# Patient Record
Sex: FEMALE | Race: WHITE | NOT HISPANIC OR LATINO | Employment: OTHER | URBAN - METROPOLITAN AREA
[De-identification: names, ages, dates, MRNs, and addresses within clinical notes are randomized per-mention and may not be internally consistent; named-entity substitution may affect disease eponyms.]

---

## 2023-06-01 DIAGNOSIS — I10 HYPERTENSION, UNSPECIFIED TYPE: ICD-10-CM

## 2023-06-01 RX ORDER — LISINOPRIL 10 MG/1
1 TABLET ORAL DAILY
COMMUNITY
Start: 2015-01-26 | End: 2023-06-01 | Stop reason: SDUPTHER

## 2023-06-02 RX ORDER — LISINOPRIL 10 MG/1
10 TABLET ORAL DAILY
Qty: 90 TABLET | Refills: 1 | Status: SHIPPED | OUTPATIENT
Start: 2023-06-02 | End: 2023-06-14 | Stop reason: SDUPTHER

## 2023-06-14 DIAGNOSIS — I10 HYPERTENSION, UNSPECIFIED TYPE: ICD-10-CM

## 2023-06-14 RX ORDER — HYDROCHLOROTHIAZIDE 12.5 MG/1
TABLET ORAL
COMMUNITY
End: 2023-06-14 | Stop reason: SDUPTHER

## 2023-06-14 RX ORDER — AMIODARONE HYDROCHLORIDE 200 MG/1
TABLET ORAL
COMMUNITY

## 2023-06-14 RX ORDER — HYDROCHLOROTHIAZIDE 12.5 MG/1
TABLET ORAL
Qty: 90 TABLET | Refills: 3 | Status: SHIPPED | OUTPATIENT
Start: 2023-06-14 | End: 2023-08-04 | Stop reason: SDUPTHER

## 2023-06-14 RX ORDER — LEVOTHYROXINE SODIUM 100 UG/1
100 TABLET ORAL DAILY
COMMUNITY
End: 2023-06-14 | Stop reason: SDUPTHER

## 2023-06-14 RX ORDER — LISINOPRIL 10 MG/1
10 TABLET ORAL DAILY
Qty: 90 TABLET | Refills: 1 | Status: SHIPPED | OUTPATIENT
Start: 2023-06-14 | End: 2024-06-13

## 2023-06-14 RX ORDER — LEVOTHYROXINE SODIUM 100 UG/1
100 TABLET ORAL DAILY
Qty: 90 TABLET | Refills: 3 | Status: SHIPPED | OUTPATIENT
Start: 2023-06-14 | End: 2023-08-04 | Stop reason: SDUPTHER

## 2023-06-29 ENCOUNTER — TELEPHONE (OUTPATIENT)
Dept: PRIMARY CARE | Facility: CLINIC | Age: 88
End: 2023-06-29
Payer: MEDICARE

## 2023-06-29 NOTE — TELEPHONE ENCOUNTER
DAUGHTER RAMILA CALLED AND STATED THAT SHE WAS TOLD HER MOM WOULD NEED AN ULTRASOUND ORDER FOR HER BREAST BECAUSE HER MAMMOGRAM SHOWED THAT THERE WAS A LUMP.  RAMILA STATES THEY HAD NO KNOWLEDGE THAT HER MOM HAD A LUMP IN HER BREAST.  PLEASE ADVISE  Thank you

## 2023-07-20 ENCOUNTER — TELEPHONE (OUTPATIENT)
Dept: PRIMARY CARE | Facility: CLINIC | Age: 88
End: 2023-07-20
Payer: MEDICARE

## 2023-07-20 NOTE — TELEPHONE ENCOUNTER
Talked to son about mammogram. He wants to make sure that mamm is not going to take images of the prosthetic and pt be charged. I told him to discuss that with rad services at the time of imaging-he said he will pass it onto his sister and mom (pt)

## 2023-07-20 NOTE — TELEPHONE ENCOUNTER
I left a general msg for pt to call back r/t her mammogram questions.  Per the provider, pt is to have a general screening only. No indication for other type at this time

## 2023-08-03 ENCOUNTER — TELEPHONE (OUTPATIENT)
Dept: PRIMARY CARE | Facility: CLINIC | Age: 88
End: 2023-08-03
Payer: MEDICARE

## 2023-08-03 NOTE — TELEPHONE ENCOUNTER
Daughter, Bria, in office today. She is requesting a regular MAMM screening verse Diagnostic and ultrasound. Per provider note, ok for regular screening. I gave her a new req and faxed it to Central Scheduling

## 2023-08-04 DIAGNOSIS — I10 HYPERTENSION, UNSPECIFIED TYPE: ICD-10-CM

## 2023-08-04 RX ORDER — METOPROLOL TARTRATE 50 MG/1
TABLET ORAL
COMMUNITY
End: 2023-08-04 | Stop reason: SDUPTHER

## 2023-08-04 RX ORDER — METOPROLOL TARTRATE 50 MG/1
50 TABLET ORAL 2 TIMES DAILY
Qty: 90 TABLET | Refills: 3 | Status: SHIPPED | OUTPATIENT
Start: 2023-08-04

## 2023-08-04 RX ORDER — LEVOTHYROXINE SODIUM 100 UG/1
100 TABLET ORAL DAILY
Qty: 90 TABLET | Refills: 3 | Status: SHIPPED | OUTPATIENT
Start: 2023-08-04 | End: 2023-09-06 | Stop reason: SDUPTHER

## 2023-08-04 RX ORDER — HYDROCHLOROTHIAZIDE 12.5 MG/1
TABLET ORAL
Qty: 90 TABLET | Refills: 3 | Status: SHIPPED | OUTPATIENT
Start: 2023-08-04

## 2023-09-06 ENCOUNTER — TELEPHONE (OUTPATIENT)
Dept: PRIMARY CARE | Facility: CLINIC | Age: 88
End: 2023-09-06

## 2023-09-06 ENCOUNTER — OFFICE VISIT (OUTPATIENT)
Dept: PRIMARY CARE | Facility: CLINIC | Age: 88
End: 2023-09-06
Payer: MEDICARE

## 2023-09-06 VITALS
HEART RATE: 45 BPM | HEIGHT: 60 IN | DIASTOLIC BLOOD PRESSURE: 58 MMHG | TEMPERATURE: 98.4 F | WEIGHT: 173.2 LBS | SYSTOLIC BLOOD PRESSURE: 110 MMHG | BODY MASS INDEX: 34 KG/M2 | OXYGEN SATURATION: 98 %

## 2023-09-06 DIAGNOSIS — I48.91 ATRIAL FIBRILLATION, UNSPECIFIED TYPE (MULTI): Primary | ICD-10-CM

## 2023-09-06 DIAGNOSIS — I10 HYPERTENSION, UNSPECIFIED TYPE: ICD-10-CM

## 2023-09-06 DIAGNOSIS — E03.9 HYPOTHYROIDISM, UNSPECIFIED TYPE: ICD-10-CM

## 2023-09-06 DIAGNOSIS — C50.919 MALIGNANT NEOPLASM OF FEMALE BREAST, UNSPECIFIED ESTROGEN RECEPTOR STATUS, UNSPECIFIED LATERALITY, UNSPECIFIED SITE OF BREAST (MULTI): ICD-10-CM

## 2023-09-06 PROCEDURE — 1159F MED LIST DOCD IN RCRD: CPT | Performed by: INTERNAL MEDICINE

## 2023-09-06 PROCEDURE — 99214 OFFICE O/P EST MOD 30 MIN: CPT | Performed by: INTERNAL MEDICINE

## 2023-09-06 PROCEDURE — 3074F SYST BP LT 130 MM HG: CPT | Performed by: INTERNAL MEDICINE

## 2023-09-06 PROCEDURE — 3078F DIAST BP <80 MM HG: CPT | Performed by: INTERNAL MEDICINE

## 2023-09-06 PROCEDURE — 1036F TOBACCO NON-USER: CPT | Performed by: INTERNAL MEDICINE

## 2023-09-06 RX ORDER — LEVOTHYROXINE SODIUM 100 UG/1
100 TABLET ORAL DAILY
Qty: 90 TABLET | Refills: 3 | Status: SHIPPED | OUTPATIENT
Start: 2023-09-06

## 2023-09-06 NOTE — TELEPHONE ENCOUNTER
CALL FROM SCHEDULING. PT DAUGHTER (NOT SURE OF NAME PER SCHEDULING) STATES IT IS FOR SCREENING NOT DIAGNOSTIC FOR MAMMOGRAM. THERE ARE 3 ORDERS MARKED DIAGNOSTIC. PLEASE CONFIRM

## 2023-09-06 NOTE — PROGRESS NOTES
Subjective   Patient ID: Jessica Thayer is a 89 y.o. female who presents for the following  Chronic disease follow up  MEDICARE WELLNESS Feb 3, 2023    Assessment/Plan   HTN: stable,  Currently on hctz, lisinopril; continue     A-Fib: STABLE Patient follows with Dr. Delcid. patient is taking xarelto 15mg daily. Continue amiodarone. Will see Dr Delcid next week. No bleeding on xarelto reported.      Hypothyroid:  stable, Pt is currently on amiodarone. On Synthroid 112mcg.      Breast cancer (HER2 and Paget, R and L respectfully): Diagnosed in 2009. In remission for 10 years. Patient no longer Dr Mack with [facility]. but needs a diagnostic left breast yearly mammogram September 2023 scheduled       patient and son wants DNR CCA     HPI  Female with A. fib (on Amiodarone and Xeralto), Hypothyroid, Vertigo, HTN, HLD, OA, Left Breast Cancer s/p Left Mastectomy, DDD/DJD Spinal Segments and Hearing Impairment comes for the following:     Jamaal (oldest son) who is with patient today.      No new issues today.       HTN: Patient denies any headaches, blurred vision. Patient denies any stroke like symptoms. she says that her home BP is systolics are 120s      A-Fib: Patient follows with Dr. Delcid. Denies any angina. Denies any sob chest pain or pressure especially with exertion. Patient follows with cardiology on a regular basis and will see Dr eDlcid next week.      Hypothyroid: Patient denies any hypo or hypothyroid symptoms. Patient denies any palpitations, diarrhea or constipation     Breast cancer (HER2 and Paget, R and L respectfully): Diagnosed in 2009. In remission for > 10 years. Patient following with Dr Mack     Visit Vitals  /58 (Patient Position: Standing) Comment: standing   Pulse (!) 45   Temp 36.9 °C (98.4 °F)   Ht 1.524 m (5')   Wt 78.6 kg (173 lb 3.2 oz)   SpO2 98%   BMI 33.83 kg/m²   Smoking Status Never   BSA 1.82 m²     PHYSICAL EXAM   General appearance: Alert and in no acute distress. speech is clear  and coherent  HEENT: Sclera and conjunctiva white, EOMI,     Respiratory : No respiratory distress, normal respiratory rhythm and effort. Clear bilateral breath sounds. No wheezing or rhonchi.   Cardiovascular: heart rate regular, S1, S2. no murmurs. no Lower extremity edema  Skin inspection: Normal skin color and pigmentation, normal skin turgor and no visible rash, induration, or cellulitis  MSK: 5/5 strength upper and lower extremities without gait abnormalities. no loss of muscle mass   Neuro: 2-12 CN grossly intact.  no slurred speech. no lateralizing deficit  Psychiatric Orientation: Oriented to person, place, and time. no depression, homicidal or suicidal thoughts, normal affect       REVIEW OF SYSTEMS   Constitutional: not feeling tired and no fever, chills or sweats. Denies weight loss   no headache  Cardiovascular: no exertional chest pain, no palpitations, no lower extremity edema    Lungs: Denies shortness of breath, exertional dyspnea, wheezing  Gastrointestinal: no change in bowel habits, no diarrhea, no nausea, no vomiting and no abdominal pain.   Musculoskeletal: no myalgias, no muscle weakness and no limb swelling.   Skin: no rashes, no change in skin color and pigmentation and no skin lumps.   Neurological: no headaches, no seizures, no numbness, no lateralizing deficits and no fainting.   Psychiatric: no depression and no anxiety.   Urine: denies polyuria, hematuria, dysuria        No Known Allergies    Current Outpatient Medications   Medication Sig Dispense Refill    amiodarone (Pacerone) 200 mg tablet TAKE 1/2 TABLET BY MOUTH EVERY DAY  5 TIMES A WEEK. SKIP 2 DAYS A WEEK      hydroCHLOROthiazide (HYDRODiuril) 12.5 mg tablet TAKE ONE TABLET BY MOUTH DAILY  not on mondays thursdays  or saturdays. 90 tablet 3    levothyroxine (Synthroid, Levoxyl) 100 mcg tablet Take 1 tablet (100 mcg) by mouth once daily. 90 tablet 3    lisinopril 10 mg tablet Take 1 tablet (10 mg) by mouth once daily. 90 tablet  1    lovastatin (Mevacor) 20 mg tablet TAKE 1 TABLET DAILY AS DIRECTED. 90 tablet 0    metoprolol tartrate (Lopressor) 50 mg tablet Take 1 tablet (50 mg) by mouth 2 times a day. 90 tablet 3     No current facility-administered medications for this visit.       Objective     No visits with results within 4 Month(s) from this visit.   Latest known visit with results is:   Legacy Encounter on 08/14/2019   Component Date Value Ref Range Status    Color, Urine 08/14/2019 YELLOW  STRAW,YELLOW Final    Appearance, Urine 08/14/2019 HAZY  CLEAR Final    Specific Gravity, Urine 08/14/2019 1.008  1.005 - 1.035 Final    pH, Urine 08/14/2019 7.0  5.0 - 8.0 Final    Protein, Urine 08/14/2019 NEGATIVE  NEGATIVE mg/dL Final    Glucose, Urine 08/14/2019 NEGATIVE  NEGATIVE mg/dL Final    Blood, Urine 08/14/2019 NEGATIVE  NEGATIVE Final    Ketones, Urine 08/14/2019 NEGATIVE  NEGATIVE mg/dL Final    Bilirubin, Urine 08/14/2019 NEGATIVE  NEGATIVE Final    Urobilinogen, Urine 08/14/2019 <2.0  0.0 - 1.9 mg/dL Final    Nitrite, Urine 08/14/2019 POSITIVE (A)  NEGATIVE Final    Leukocyte Esterase, Urine 08/14/2019 LARGE (3+) (A)  NEGATIVE Final    WBC, Urine 08/14/2019 >182 (A)  0 - 5 /HPF Final    WBC Clumps, Urine 08/14/2019 MANY  /HPF Final    RBC, Urine 08/14/2019 4  0 - 5 /HPF Final    Squamous Epithelial Cells, Urine 08/14/2019 <1  /HPF Final       Radiology: Reviewed imaging in powerchart.  No results found.    No family history on file.  Social History     Socioeconomic History    Marital status:      Spouse name: None    Number of children: None    Years of education: None    Highest education level: None   Occupational History    None   Tobacco Use    Smoking status: Never    Smokeless tobacco: Never   Substance and Sexual Activity    Alcohol use: Never    Drug use: Never    Sexual activity: None   Other Topics Concern    None   Social History Narrative    None     Social Determinants of Health     Financial Resource Strain:  Not on file   Food Insecurity: Not on file   Transportation Needs: Not on file   Physical Activity: Not on file   Stress: Not on file   Social Connections: Not on file   Intimate Partner Violence: Not on file   Housing Stability: Not on file     Past Medical History:   Diagnosis Date    Arthropathy, unspecified 12/04/2013    Arthropathy    Dermatitis, unspecified 12/04/2013    Dermatitis, eczematoid    Encounter for other preprocedural examination 12/04/2013    Pre-op examination    Encounter for screening for malignant neoplasm of colon 12/04/2013    Encounter for screening for malignant neoplasm of colon    Malignant neoplasm of unspecified site of unspecified female breast (CMS/[facility]) 12/03/2013    Adenocarcinoma of breast    Obesity, unspecified 12/04/2013    Obesity    Osteitis deformans of unspecified bone 12/04/2013    Paget's disease of bone    Other abnormal and inconclusive findings on diagnostic imaging of breast 12/04/2013    Mammogram abnormal    Other conditions influencing health status 12/04/2013    Diverticulitis Of The Bladder    Other conditions influencing health status 12/04/2013    Neoplasm Of Unspecified Behavior    Other conditions influencing health status 12/04/2013    Large Intestine Neoplasm    Other specified diseases of anus and rectum 12/04/2013    Rectal pain    Personal history of malignant melanoma of skin 01/08/2021    History of malignant melanoma    Personal history of other diseases of the circulatory system 04/16/2019    History of chronic atrial fibrillation    Personal history of other diseases of the musculoskeletal system and connective tissue     History of degenerative disc disease    Personal history of other diseases of the musculoskeletal system and connective tissue     History of osteoarthritis    Personal history of other endocrine, nutritional and metabolic disease     History of hypokalemia    Sciatica, unspecified side 12/04/2013    Sciatica    Spondylosis without  myelopathy or radiculopathy, cervical region     DJD (degenerative joint disease) of cervical spine    Unspecified lump in unspecified breast 12/04/2013    Lump or mass in breast     Past Surgical History:   Procedure Laterality Date    CHOLECYSTECTOMY  05/30/2014    Cholecystectomy    MASTECTOMY  05/30/2014    Breast Surgery Mastectomy    OTHER SURGICAL HISTORY  02/26/2014    Aftercare Following Surgery    TOTAL KNEE ARTHROPLASTY  05/30/2014    Knee Replacement       Charting was completed using voice recognition technology and may include unintended errors.

## 2023-09-11 ENCOUNTER — TELEPHONE (OUTPATIENT)
Dept: PRIMARY CARE | Facility: CLINIC | Age: 88
End: 2023-09-11
Payer: MEDICARE

## 2023-09-11 NOTE — TELEPHONE ENCOUNTER
I DID CALL SCHEDULING AND SPOKE WITH [place]. SHE STATES PT HAS HAD SCREENING PAST 5 YRS UNILATERAL

## 2023-09-13 NOTE — TELEPHONE ENCOUNTER
I talked to Daughter and told her the below from Dr Pop    Breast cancer (HER2 and Paget, R and L respectfully): Diagnosed in 2009. In remission for 10 years. Patient no longer Dr Mack with [facility]. but needs a diagnostic left breast yearly mammogram September 2023 scheduled      Dr Mack will send over   It is supposed to be a regular screening  Per daughter-I educated that b/c of past Breast Ca that Dr Pop ordered the diagnostic- she verbalized understanding.    Daughter states she will take pt next week to Mamm for regular  screening only and if  thinks she needs something more after that then she will talk to him personally about it.

## 2023-10-11 ENCOUNTER — TELEPHONE (OUTPATIENT)
Dept: PRIMARY CARE | Facility: CLINIC | Age: 88
End: 2023-10-11
Payer: MEDICARE

## 2023-10-11 ENCOUNTER — TELEPHONE (OUTPATIENT)
Dept: PRIMARY CARE | Facility: CLINIC | Age: 88
End: 2023-10-11

## 2023-10-11 NOTE — TELEPHONE ENCOUNTER
I called  the visiting nurse for the b/p on this pt-it was WNL yesterday  Daughter called stating it was low  Her mouth is dry and her skin tents on her dorsal hand and she is dizzy intermittently   I told her to hold b/p meds and go to the ER for fluids  Her daughter Meghna states she does not want to because it takes a lot to get her there. She took her b/p two more times and it was also low-I told her to make her decision but my clinical decision stands. She verbalized uderstanding

## 2023-10-11 NOTE — TELEPHONE ENCOUNTER
PT was discharged from hospital on 10/8/23.  PT's daughter is concerned that PT's BP is to low and she is wondering if she should hold meds back?    BP 75/51    Nurse was in yesterday and stated BP was low.       Meghna Rodarte CB is:  [phone]

## 2023-10-11 NOTE — TELEPHONE ENCOUNTER
Visiting nurse started seeing this pt per Nurse HOOKS for Nursing and PT care at home    Her number is [phone] in case we need to contact her int he future

## 2024-02-02 ENCOUNTER — TELEPHONE (OUTPATIENT)
Dept: PRIMARY CARE | Facility: CLINIC | Age: 89
End: 2024-02-02

## 2024-02-02 DIAGNOSIS — E78.5 HYPERLIPIDEMIA, UNSPECIFIED HYPERLIPIDEMIA TYPE: Primary | ICD-10-CM

## 2024-02-02 RX ORDER — ATORVASTATIN CALCIUM 20 MG/1
20 TABLET, FILM COATED ORAL DAILY
Qty: 90 TABLET | Refills: 3 | Status: SHIPPED | OUTPATIENT
Start: 2024-02-02 | End: 2025-02-01

## 2024-02-02 NOTE — TELEPHONE ENCOUNTER
Pt was wondering if you could change lovastatin to atorvastatin     [facility] pharm [phone]

## 2024-03-06 ENCOUNTER — OFFICE VISIT (OUTPATIENT)
Dept: PRIMARY CARE | Facility: CLINIC | Age: 89
End: 2024-03-06
Payer: MEDICARE

## 2024-03-06 ENCOUNTER — LAB (OUTPATIENT)
Dept: LAB | Facility: LAB | Age: 89
End: 2024-03-06
Payer: MEDICARE

## 2024-03-06 VITALS
HEIGHT: 65 IN | BODY MASS INDEX: 27.32 KG/M2 | HEART RATE: 71 BPM | WEIGHT: 164 LBS | OXYGEN SATURATION: 97 % | DIASTOLIC BLOOD PRESSURE: 64 MMHG | SYSTOLIC BLOOD PRESSURE: 120 MMHG

## 2024-03-06 DIAGNOSIS — C50.919 MALIGNANT NEOPLASM OF FEMALE BREAST, UNSPECIFIED ESTROGEN RECEPTOR STATUS, UNSPECIFIED LATERALITY, UNSPECIFIED SITE OF BREAST (MULTI): ICD-10-CM

## 2024-03-06 DIAGNOSIS — E03.9 HYPOTHYROIDISM, UNSPECIFIED TYPE: ICD-10-CM

## 2024-03-06 DIAGNOSIS — I10 HYPERTENSION, UNSPECIFIED TYPE: ICD-10-CM

## 2024-03-06 DIAGNOSIS — I48.91 ATRIAL FIBRILLATION, UNSPECIFIED TYPE (MULTI): ICD-10-CM

## 2024-03-06 DIAGNOSIS — Z00.00 WELLNESS EXAMINATION: Primary | ICD-10-CM

## 2024-03-06 LAB
ALBUMIN SERPL BCP-MCNC: 4.1 G/DL (ref 3.4–5)
ALP SERPL-CCNC: 91 U/L (ref 33–136)
ALT SERPL W P-5'-P-CCNC: 11 U/L (ref 7–45)
ANION GAP SERPL CALC-SCNC: 11 MMOL/L (ref 10–20)
AST SERPL W P-5'-P-CCNC: 16 U/L (ref 9–39)
BILIRUB SERPL-MCNC: 0.7 MG/DL (ref 0–1.2)
BUN SERPL-MCNC: 16 MG/DL (ref 6–23)
CALCIUM SERPL-MCNC: 9.2 MG/DL (ref 8.6–10.6)
CHLORIDE SERPL-SCNC: 99 MMOL/L (ref 98–107)
CO2 SERPL-SCNC: 27 MMOL/L (ref 21–32)
CREAT SERPL-MCNC: 0.75 MG/DL (ref 0.5–1.05)
EGFRCR SERPLBLD CKD-EPI 2021: 76 ML/MIN/1.73M*2
ERYTHROCYTE [DISTWIDTH] IN BLOOD BY AUTOMATED COUNT: 13.9 % (ref 11.5–14.5)
GLUCOSE SERPL-MCNC: 90 MG/DL (ref 74–99)
HCT VFR BLD AUTO: 46.5 % (ref 36–46)
HGB BLD-MCNC: 15.3 G/DL (ref 12–16)
MCH RBC QN AUTO: 31.4 PG (ref 26–34)
MCHC RBC AUTO-ENTMCNC: 32.9 G/DL (ref 32–36)
MCV RBC AUTO: 95 FL (ref 80–100)
NRBC BLD-RTO: 0 /100 WBCS (ref 0–0)
PLATELET # BLD AUTO: 259 X10*3/UL (ref 150–450)
POTASSIUM SERPL-SCNC: 4.2 MMOL/L (ref 3.5–5.3)
PROT SERPL-MCNC: 6.4 G/DL (ref 6.4–8.2)
RBC # BLD AUTO: 4.88 X10*6/UL (ref 4–5.2)
SODIUM SERPL-SCNC: 133 MMOL/L (ref 136–145)
TSH SERPL-ACNC: 0.9 MIU/L (ref 0.44–3.98)
WBC # BLD AUTO: 6.4 X10*3/UL (ref 4.4–11.3)

## 2024-03-06 PROCEDURE — 1170F FXNL STATUS ASSESSED: CPT | Performed by: INTERNAL MEDICINE

## 2024-03-06 PROCEDURE — 84443 ASSAY THYROID STIM HORMONE: CPT

## 2024-03-06 PROCEDURE — 36415 COLL VENOUS BLD VENIPUNCTURE: CPT

## 2024-03-06 PROCEDURE — 3078F DIAST BP <80 MM HG: CPT | Performed by: INTERNAL MEDICINE

## 2024-03-06 PROCEDURE — G0439 PPPS, SUBSEQ VISIT: HCPCS | Performed by: INTERNAL MEDICINE

## 2024-03-06 PROCEDURE — 85027 COMPLETE CBC AUTOMATED: CPT

## 2024-03-06 PROCEDURE — 3074F SYST BP LT 130 MM HG: CPT | Performed by: INTERNAL MEDICINE

## 2024-03-06 PROCEDURE — 80053 COMPREHEN METABOLIC PANEL: CPT

## 2024-03-06 PROCEDURE — 99497 ADVNCD CARE PLAN 30 MIN: CPT | Performed by: INTERNAL MEDICINE

## 2024-03-06 PROCEDURE — 1159F MED LIST DOCD IN RCRD: CPT | Performed by: INTERNAL MEDICINE

## 2024-03-06 PROCEDURE — 99214 OFFICE O/P EST MOD 30 MIN: CPT | Performed by: INTERNAL MEDICINE

## 2024-03-06 PROCEDURE — 1160F RVW MEDS BY RX/DR IN RCRD: CPT | Performed by: INTERNAL MEDICINE

## 2024-03-06 PROCEDURE — 1036F TOBACCO NON-USER: CPT | Performed by: INTERNAL MEDICINE

## 2024-03-06 ASSESSMENT — PATIENT HEALTH QUESTIONNAIRE - PHQ9
1. LITTLE INTEREST OR PLEASURE IN DOING THINGS: NOT AT ALL
SUM OF ALL RESPONSES TO PHQ9 QUESTIONS 1 AND 2: 0
2. FEELING DOWN, DEPRESSED OR HOPELESS: NOT AT ALL

## 2024-03-06 ASSESSMENT — ACTIVITIES OF DAILY LIVING (ADL)
DRESSING: INDEPENDENT
DOING_HOUSEWORK: NEEDS ASSISTANCE
GROCERY_SHOPPING: TOTAL CARE
TAKING_MEDICATION: TOTAL CARE
BATHING: INDEPENDENT
MANAGING_FINANCES: TOTAL CARE

## 2024-03-06 NOTE — PROGRESS NOTES
Subjective   Patient ID: Jessica Thayer is a 89 y.o. female who presents for the following  MEDICARE WELLNESS 3/6/2024 AND CHRONIC FOLLOW UP     Assessment/Plan   S/p fall mechanical fall: home health physical therapy    HTN: stable,     hydrochlorothiazide taking 12.5mg MWF,   Lisinopril 10mg daily   Metoprolol 50mg bid      A-Fib: STABLE Patient follows with Dr. Delcid. patient is taking xarelto 15mg daily. Continue amiodarone. Will see Dr Delcid next week. No bleeding on xarelto reported.      Hypothyroid:  stable, Pt is currently on amiodarone. On Synthroid 112mcg.      Breast cancer (HER2 and Paget, R and L respectfully): Diagnosed in 2009. In remission for 10 years. Patient no longer Dr Frederick with [facility]. but needs a diagnostic left breast  Patient no longer wants yearly mammograms       patient and son wants DNR CCA   Lots of help total care at home through daughter. 76  Advanced care planning was discussed for 20 mins  DIscussed in details the options of advanced directives with patient in a voluntary nature. We discussed the differences between full code, comfort care arrest and comfort care. Patient was educated in detail regarding end-of-life care including options for hospice and palliative care. I provided details to patient regarding the importance of creating both a Living Will and Power of  documents and encouraged patient to complete these documents.      HPI  Female with A. fib (on Amiodarone and Xeralto), Hypothyroid, Vertigo, HTN, HLD, OA, Left Breast Cancer s/p Left Mastectomy, DDD/DJD Spinal Segments and Hearing Impairment comes for the following:     Jamaal (oldest son) who is with patient today.      No new issues today.       HTN: Patient denies any headaches, blurred vision. Patient denies any stroke like symptoms. she says that her home BP is systolics are 120s      A-Fib: Patient follows with Dr. Delcid. Denies any angina. Denies any sob chest pain or pressure especially with exertion.  "Patient follows with cardiology on a regular basis and will see Dr Delcid next week.      Hypothyroid: Patient denies any hypo or hypothyroid symptoms. Patient denies any palpitations, diarrhea or constipation     Breast cancer (HER2 and Paget, R and L respectfully): Diagnosed in 2009. In remission for > 10 years. Patient following with Dr Mack     Visit Vitals  /64 (BP Location: Right arm, Patient Position: Sitting, BP Cuff Size: Adult)   Pulse 71   Ht 1.651 m (5' 5\")   Wt 74.4 kg (164 lb)   SpO2 97%   BMI 27.29 kg/m²   Smoking Status Never   BSA 1.85 m²     PHYSICAL EXAM   General appearance: Alert and in no acute distress. speech is clear and coherent  HEENT: Sclera and conjunctiva white, EOMI,     Respiratory : No respiratory distress, normal respiratory rhythm and effort. Clear bilateral breath sounds. No wheezing or rhonchi.   Cardiovascular: heart rate regular, S1, S2. no murmurs. no Lower extremity edema  Skin inspection: Normal skin color and pigmentation, normal skin turgor and no visible rash, induration, or cellulitis  MSK: 5/5 strength upper and lower extremities without gait abnormalities. no loss of muscle mass   Neuro: 2-12 CN grossly intact.  no slurred speech. no lateralizing deficit  Psychiatric Orientation: Oriented to person, place, and time. no depression, homicidal or suicidal thoughts, normal affect       REVIEW OF SYSTEMS   Constitutional: not feeling tired and no fever, chills or sweats. Denies weight loss   no headache  Cardiovascular: no exertional chest pain, no palpitations, no lower extremity edema    Lungs: Denies shortness of breath, exertional dyspnea, wheezing  Gastrointestinal: no change in bowel habits, no diarrhea, no nausea, no vomiting and no abdominal pain.   Musculoskeletal: no myalgias, no muscle weakness and no limb swelling.   Skin: no rashes, no change in skin color and pigmentation and no skin lumps.   Neurological: no headaches, no seizures, no numbness, no " lateralizing deficits and no fainting.   Psychiatric: no depression and no anxiety.   Urine: denies polyuria, hematuria, dysuria        No Known Allergies    Current Outpatient Medications   Medication Sig Dispense Refill    amiodarone (Pacerone) 200 mg tablet TAKE 1/2 TABLET BY MOUTH EVERY DAY  5 TIMES A WEEK. SKIP 2 DAYS A WEEK      atorvastatin (Lipitor) 20 mg tablet Take 1 tablet (20 mg) by mouth once daily. 90 tablet 3    hydroCHLOROthiazide (HYDRODiuril) 12.5 mg tablet TAKE ONE TABLET BY MOUTH DAILY  not on mondays thursdays  or saturdays. (Patient taking differently: Take 1 tablet (12.5 mg) by mouth 3 times a week. TAKE ONE TABLET BY MOUTH DAILY  on Mondays, Thursdays and Saturdays) 90 tablet 3    levothyroxine (Synthroid, Levoxyl) 100 mcg tablet Take 1 tablet (100 mcg) by mouth once daily. 90 tablet 3    lisinopril 10 mg tablet Take 1 tablet (10 mg) by mouth once daily. 90 tablet 1    metoprolol tartrate (Lopressor) 50 mg tablet Take 1 tablet (50 mg) by mouth 2 times a day. 90 tablet 3    rivaroxaban (Xarelto) 15 mg tablet Take 1 tablet (15 mg) by mouth once daily in the evening. Take with meals.       No current facility-administered medications for this visit.       Objective     No visits with results within 4 Month(s) from this visit.   Latest known visit with results is:   Legacy Encounter on 08/14/2019   Component Date Value Ref Range Status    Color, Urine 08/14/2019 YELLOW  STRAW,YELLOW Final    Appearance, Urine 08/14/2019 HAZY  CLEAR Final    Specific Gravity, Urine 08/14/2019 1.008  1.005 - 1.035 Final    pH, Urine 08/14/2019 7.0  5.0 - 8.0 Final    Protein, Urine 08/14/2019 NEGATIVE  NEGATIVE mg/dL Final    Glucose, Urine 08/14/2019 NEGATIVE  NEGATIVE mg/dL Final    Blood, Urine 08/14/2019 NEGATIVE  NEGATIVE Final    Ketones, Urine 08/14/2019 NEGATIVE  NEGATIVE mg/dL Final    Bilirubin, Urine 08/14/2019 NEGATIVE  NEGATIVE Final    Urobilinogen, Urine 08/14/2019 <2.0  0.0 - 1.9 mg/dL Final     Nitrite, Urine 08/14/2019 POSITIVE (A)  NEGATIVE Final    Leukocyte Esterase, Urine 08/14/2019 LARGE (3+) (A)  NEGATIVE Final    WBC, Urine 08/14/2019 >182 (A)  0 - 5 /HPF Final    WBC Clumps, Urine 08/14/2019 MANY  /HPF Final    RBC, Urine 08/14/2019 4  0 - 5 /HPF Final    Squamous Epithelial Cells, Urine 08/14/2019 <1  /HPF Final       Radiology: Reviewed imaging in powerchart.  No results found.    No family history on file.  Social History     Socioeconomic History    Marital status:      Spouse name: None    Number of children: None    Years of education: None    Highest education level: None   Occupational History    None   Tobacco Use    Smoking status: Never    Smokeless tobacco: Never   Substance and Sexual Activity    Alcohol use: Never    Drug use: Never    Sexual activity: None   Other Topics Concern    None   Social History Narrative    None     Social Determinants of Health     Financial Resource Strain: Not on file   Food Insecurity: Not on file   Transportation Needs: Not on file   Physical Activity: Not on file   Stress: Not on file   Social Connections: Not on file   Intimate Partner Violence: Not on file   Housing Stability: Not on file     Past Medical History:   Diagnosis Date    Arthropathy, unspecified 12/04/2013    Arthropathy    Dermatitis, unspecified 12/04/2013    Dermatitis, eczematoid    Encounter for other preprocedural examination 12/04/2013    Pre-op examination    Encounter for screening for malignant neoplasm of colon 12/04/2013    Encounter for screening for malignant neoplasm of colon    Malignant neoplasm of unspecified site of unspecified female breast (CMS/[facility]) 12/03/2013    Adenocarcinoma of breast    Obesity, unspecified 12/04/2013    Obesity    Osteitis deformans of unspecified bone 12/04/2013    Paget's disease of bone    Other abnormal and inconclusive findings on diagnostic imaging of breast 12/04/2013    Mammogram abnormal    Other conditions influencing health  status 12/04/2013    Diverticulitis Of The Bladder    Other conditions influencing health status 12/04/2013    Neoplasm Of Unspecified Behavior    Other conditions influencing health status 12/04/2013    Large Intestine Neoplasm    Other specified diseases of anus and rectum 12/04/2013    Rectal pain    Personal history of malignant melanoma of skin 01/08/2021    History of malignant melanoma    Personal history of other diseases of the circulatory system 04/16/2019    History of chronic atrial fibrillation    Personal history of other diseases of the musculoskeletal system and connective tissue     History of degenerative disc disease    Personal history of other diseases of the musculoskeletal system and connective tissue     History of osteoarthritis    Personal history of other endocrine, nutritional and metabolic disease     History of hypokalemia    Sciatica, unspecified side 12/04/2013    Sciatica    Spondylosis without myelopathy or radiculopathy, cervical region     DJD (degenerative joint disease) of cervical spine    Unspecified lump in unspecified breast 12/04/2013    Lump or mass in breast     Past Surgical History:   Procedure Laterality Date    CHOLECYSTECTOMY  05/30/2014    Cholecystectomy    MASTECTOMY  05/30/2014    Breast Surgery Mastectomy    OTHER SURGICAL HISTORY  02/26/2014    Aftercare Following Surgery    TOTAL KNEE ARTHROPLASTY  05/30/2014    Knee Replacement       Charting was completed using voice recognition technology and may include unintended errors.

## 2024-06-10 ENCOUNTER — TELEPHONE (OUTPATIENT)
Dept: PRIMARY CARE | Facility: CLINIC | Age: 89
End: 2024-06-10
Payer: MEDICARE

## 2024-06-10 NOTE — TELEPHONE ENCOUNTER
[facility] was seen at [facility], provider called stated he is concerned. She has really bad impacted ear wax and debrox is not softening it up. Provider stated it is very deep and wondering if Dr. Pop can refer patient to ENT specialist for removal. Please call patient son sam back, if Dr. Pop is able to talk with the son he would like to discuss patients care.

## 2024-06-11 ENCOUNTER — OFFICE VISIT (OUTPATIENT)
Dept: PRIMARY CARE | Facility: CLINIC | Age: 89
End: 2024-06-11
Payer: MEDICARE

## 2024-06-11 VITALS
BODY MASS INDEX: 27.82 KG/M2 | WEIGHT: 167 LBS | OXYGEN SATURATION: 96 % | DIASTOLIC BLOOD PRESSURE: 83 MMHG | HEIGHT: 65 IN | HEART RATE: 77 BPM | SYSTOLIC BLOOD PRESSURE: 144 MMHG

## 2024-06-11 DIAGNOSIS — H61.21 IMPACTED CERUMEN OF RIGHT EAR: Primary | ICD-10-CM

## 2024-06-11 PROCEDURE — 69209 REMOVE IMPACTED EAR WAX UNI: CPT | Performed by: INTERNAL MEDICINE

## 2024-06-11 PROCEDURE — 1159F MED LIST DOCD IN RCRD: CPT | Performed by: INTERNAL MEDICINE

## 2024-06-11 PROCEDURE — 99212 OFFICE O/P EST SF 10 MIN: CPT | Performed by: INTERNAL MEDICINE

## 2024-06-11 PROCEDURE — 3077F SYST BP >= 140 MM HG: CPT | Performed by: INTERNAL MEDICINE

## 2024-06-11 PROCEDURE — 3079F DIAST BP 80-89 MM HG: CPT | Performed by: INTERNAL MEDICINE

## 2024-06-11 NOTE — PROGRESS NOTES
Subjective   Patient ID: Jessica Thayer is a 89 y.o. female who presents for the following  MEDICARE WELLNESS 3/6/2024 AND CHRONIC FOLLOW UP     Assessment/Plan   Right ceruman impaction s/p successful irrigation    Other medical issues, see below  S/p fall mechanical fall: home health physical therapy    HTN: stable,     hydrochlorothiazide taking 12.5mg MWF,   Lisinopril 10mg daily   Metoprolol 50mg bid      A-Fib: STABLE Patient follows with Dr. Delcid. patient is taking xarelto 15mg daily. Continue amiodarone. Will see Dr Delcid next week. No bleeding on xarelto reported.      Hypothyroid:  stable, Pt is currently on amiodarone. On Synthroid 112mcg.      Breast cancer (HER2 and Paget, R and L respectfully): Diagnosed in 2009. In remission for 10 years. Patient no longer Dr Frederick with [facility]. but needs a diagnostic left breast  Patient no longer wants yearly mammograms       patient and son wants DNR CCA   Lots of help total care at home through daughter. 76  Advanced care planning was discussed for 20 mins  DIscussed in details the options of advanced directives with patient in a voluntary nature. We discussed the differences between full code, comfort care arrest and comfort care. Patient was educated in detail regarding end-of-life care including options for hospice and palliative care. I provided details to patient regarding the importance of creating both a Living Will and Power of  documents and encouraged patient to complete these documents.      HPI  Female with A. fib (on Amiodarone and Xeralto), Hypothyroid, Vertigo, HTN, HLD, OA, Left Breast Cancer s/p Left Mastectomy, DDD/DJD Spinal Segments and Hearing Impairment comes for the following:     Jamaal (oldest son) who is with patient today.      Right cerumen impaction. Sent from another office as she was felt she needed referral to ENT because of cerumen impaction    Other medical issues, see below     HTN: Patient denies any headaches, blurred  "vision. Patient denies any stroke like symptoms. she says that her home BP is systolics are 120s      A-Fib: Patient follows with Dr. Delcid. Denies any angina. Denies any sob chest pain or pressure especially with exertion. Patient follows with cardiology on a regular basis and will see Dr Delcid next week.      Hypothyroid: Patient denies any hypo or hypothyroid symptoms. Patient denies any palpitations, diarrhea or constipation     Breast cancer (HER2 and Paget, R and L respectfully): Diagnosed in 2009. In remission for > 10 years. Patient following with Dr Mack     Visit Vitals  /83 (BP Location: Right arm, Patient Position: Sitting, BP Cuff Size: Adult)   Pulse 77   Ht 1.651 m (5' 5\")   Wt 75.8 kg (167 lb)   SpO2 96%   BMI 27.79 kg/m²   Smoking Status Never   BSA 1.86 m²     PHYSICAL EXAM   General appearance: Alert and in no acute distress. speech is clear and coherent  HEENT: Sclera and conjunctiva white, EOMI,     Respiratory : No respiratory distress, normal respiratory rhythm and effort. Clear bilateral breath sounds. No wheezing or rhonchi.   Cardiovascular: heart rate regular, S1, S2. no murmurs. no Lower extremity edema   MSK: 5/5 strength upper and lower extremities without gait abnormalities. no loss of muscle mass   Neuro: 2-12 CN grossly intact.  no slurred speech. no lateralizing deficit         REVIEW OF SYSTEMS   Constitutional: not feeling tired and no fever, chills or sweats. no headache  Cardiovascular: no exertional chest pain, no palpitations, no lower extremity edema    Lungs: Denies shortness of breath, exertional dyspnea, wheezing  Gastrointestinal: no change in bowel habits, no diarrhea, no nausea, no vomiting    Neurological: no headaches, no seizures, no numbness, no lateralizing deficits and no fainting.   Psychiatric: no depression and no anxiety.   Urine: denies polyuria, hematuria, dysuria        No Known Allergies    Current Outpatient Medications   Medication Sig Dispense " Refill    amiodarone (Pacerone) 200 mg tablet TAKE 1/2 TABLET BY MOUTH EVERY DAY  5 TIMES A WEEK. SKIP 2 DAYS A WEEK      atorvastatin (Lipitor) 20 mg tablet Take 1 tablet (20 mg) by mouth once daily. 90 tablet 3    levothyroxine (Synthroid, Levoxyl) 100 mcg tablet Take 1 tablet (100 mcg) by mouth once daily. 90 tablet 3    lisinopril 10 mg tablet Take 1 tablet (10 mg) by mouth once daily. 90 tablet 1    metoprolol tartrate (Lopressor) 50 mg tablet Take 1 tablet (50 mg) by mouth 2 times a day. 90 tablet 3    rivaroxaban (Xarelto) 15 mg tablet Take 1 tablet (15 mg) by mouth once daily in the evening. Take with meals.      hydroCHLOROthiazide (HYDRODiuril) 12.5 mg tablet TAKE ONE TABLET BY MOUTH DAILY  not on mondays thursdays  or saturdays. (Patient taking differently: Take 1 tablet (12.5 mg) by mouth 3 times a week. TAKE ONE TABLET BY MOUTH DAILY  on Mondays, Thursdays and Saturdays) 90 tablet 3     No current facility-administered medications for this visit.       Objective     Lab on 03/06/2024   Component Date Value Ref Range Status    WBC 03/06/2024 6.4  4.4 - 11.3 x10*3/uL Final    nRBC 03/06/2024 0.0  0.0 - 0.0 /100 WBCs Final    RBC 03/06/2024 4.88  4.00 - 5.20 x10*6/uL Final    Hemoglobin 03/06/2024 15.3  12.0 - 16.0 g/dL Final    Hematocrit 03/06/2024 46.5 (H)  36.0 - 46.0 % Final    MCV 03/06/2024 95  80 - 100 fL Final    MCH 03/06/2024 31.4  26.0 - 34.0 pg Final    MCHC 03/06/2024 32.9  32.0 - 36.0 g/dL Final    RDW 03/06/2024 13.9  11.5 - 14.5 % Final    Platelets 03/06/2024 259  150 - 450 x10*3/uL Final    Glucose 03/06/2024 90  74 - 99 mg/dL Final    Sodium 03/06/2024 133 (L)  136 - 145 mmol/L Final    Potassium 03/06/2024 4.2  3.5 - 5.3 mmol/L Final    Chloride 03/06/2024 99  98 - 107 mmol/L Final    Bicarbonate 03/06/2024 27  21 - 32 mmol/L Final    Anion Gap 03/06/2024 11  10 - 20 mmol/L Final    Urea Nitrogen 03/06/2024 16  6 - 23 mg/dL Final    Creatinine 03/06/2024 0.75  0.50 - 1.05 mg/dL Final     eGFR 03/06/2024 76  >60 mL/min/1.73m*2 Final    Calcium 03/06/2024 9.2  8.6 - 10.6 mg/dL Final    Albumin 03/06/2024 4.1  3.4 - 5.0 g/dL Final    Alkaline Phosphatase 03/06/2024 91  33 - 136 U/L Final    Total Protein 03/06/2024 6.4  6.4 - 8.2 g/dL Final    AST 03/06/2024 16  9 - 39 U/L Final    Bilirubin, Total 03/06/2024 0.7  0.0 - 1.2 mg/dL Final    ALT 03/06/2024 11  7 - 45 U/L Final    Thyroid Stimulating Hormone 03/06/2024 0.90  0.44 - 3.98 mIU/L Final       Radiology: Reviewed imaging in powerchart.  No results found.    No family history on file.  Social History     Socioeconomic History    Marital status:      Spouse name: None    Number of children: None    Years of education: None    Highest education level: None   Occupational History    None   Tobacco Use    Smoking status: Never    Smokeless tobacco: Never   Substance and Sexual Activity    Alcohol use: Never    Drug use: Never    Sexual activity: None   Other Topics Concern    None   Social History Narrative    None     Social Determinants of Health     Financial Resource Strain: Not on file   Food Insecurity: Not on file   Transportation Needs: Not on file   Physical Activity: Not on file   Stress: Not on file   Social Connections: Not on file   Intimate Partner Violence: Not on file   Housing Stability: Not on file     Past Medical History:   Diagnosis Date    Arthropathy, unspecified 12/04/2013    Arthropathy    Dermatitis, unspecified 12/04/2013    Dermatitis, eczematoid    Encounter for other preprocedural examination 12/04/2013    Pre-op examination    Encounter for screening for malignant neoplasm of colon 12/04/2013    Encounter for screening for malignant neoplasm of colon    Malignant neoplasm of unspecified site of unspecified female breast (Multi) 12/03/2013    Adenocarcinoma of breast    Obesity, unspecified 12/04/2013    Obesity    Osteitis deformans of unspecified bone 12/04/2013    Paget's disease of bone    Other abnormal  and inconclusive findings on diagnostic imaging of breast 12/04/2013    Mammogram abnormal    Other conditions influencing health status 12/04/2013    Diverticulitis Of The Bladder    Other conditions influencing health status 12/04/2013    Neoplasm Of Unspecified Behavior    Other conditions influencing health status 12/04/2013    Large Intestine Neoplasm    Other specified diseases of anus and rectum 12/04/2013    Rectal pain    Personal history of malignant melanoma of skin 01/08/2021    History of malignant melanoma    Personal history of other diseases of the circulatory system 04/16/2019    History of chronic atrial fibrillation    Personal history of other diseases of the musculoskeletal system and connective tissue     History of degenerative disc disease    Personal history of other diseases of the musculoskeletal system and connective tissue     History of osteoarthritis    Personal history of other endocrine, nutritional and metabolic disease     History of hypokalemia    Sciatica, unspecified side 12/04/2013    Sciatica    Spondylosis without myelopathy or radiculopathy, cervical region     DJD (degenerative joint disease) of cervical spine    Unspecified lump in unspecified breast 12/04/2013    Lump or mass in breast     Past Surgical History:   Procedure Laterality Date    CHOLECYSTECTOMY  05/30/2014    Cholecystectomy    MASTECTOMY  05/30/2014    Breast Surgery Mastectomy    OTHER SURGICAL HISTORY  02/26/2014    Aftercare Following Surgery    TOTAL KNEE ARTHROPLASTY  05/30/2014    Knee Replacement       Charting was completed using voice recognition technology and may include unintended errors.

## 2024-06-12 PROBLEM — H61.21 IMPACTED CERUMEN OF RIGHT EAR: Status: ACTIVE | Noted: 2024-06-12

## 2024-07-11 ENCOUNTER — TELEPHONE (OUTPATIENT)
Dept: PRIMARY CARE | Facility: CLINIC | Age: 89
End: 2024-07-11
Payer: MEDICARE

## 2024-07-11 NOTE — TELEPHONE ENCOUNTER
PT fell and broke her hip this morning.  PT is being sent to  for surgery from [place].      PT's daughter is requesting either Dr Brennan or Dr Chauhan to do the surgery.

## 2024-07-26 ENCOUNTER — NURSING HOME VISIT (OUTPATIENT)
Dept: POST ACUTE CARE | Facility: EXTERNAL LOCATION | Age: 89
End: 2024-07-26
Payer: MEDICARE

## 2024-07-26 DIAGNOSIS — I48.91 ATRIAL FIBRILLATION, UNSPECIFIED TYPE (MULTI): ICD-10-CM

## 2024-07-26 DIAGNOSIS — I10 HYPERTENSION, UNSPECIFIED TYPE: ICD-10-CM

## 2024-07-26 DIAGNOSIS — Z47.89 ORTHOPEDIC AFTERCARE: Primary | ICD-10-CM

## 2024-07-26 DIAGNOSIS — E78.5 HYPERLIPIDEMIA, UNSPECIFIED HYPERLIPIDEMIA TYPE: ICD-10-CM

## 2024-07-26 DIAGNOSIS — E03.9 HYPOTHYROIDISM, UNSPECIFIED TYPE: ICD-10-CM

## 2024-07-26 PROCEDURE — 99305 1ST NF CARE MODERATE MDM 35: CPT | Performed by: STUDENT IN AN ORGANIZED HEALTH CARE EDUCATION/TRAINING PROGRAM

## 2024-07-26 NOTE — LETTER
Patient: Jessica Thyaer  : 1934    Encounter Date: 2024    Date of Service: 2024      HPI/Subjective  Jessica Thayer is a 89 y.o. female  With past medical history of A-fib, hypertension, hyperlipidemia, hypothyroidism.  She was admitted to [facility] from 2024 to 2024 for treatment of right femoral intertrochanteric fracture with right hip ORIF.  Hospitalization was complicated by acute blood loss anemia requiring 1 unit packed RBC transfusion.  Patient to have staples removed today.  Tolerating PTOT appropriately.  No fevers, chills, chest pain, shortness of breath reported.  Plan of care discussed, concerns addressed.    Denies fevers, chills, weight loss, lightheadedness, dizziness, vision changes, sore throat, runny nose, CP, SOB, cough, palpitations, n/v/d, abd pain, black/bloody stools, mood disturbance, or new numbness/weakness/tingling in arms/legs/face.        Other Medical, Surgical, Family, Social Hx, Allergies per chart in PCC.   Medication list reviewed. Please see PCC.     Objective:   Physical Exam     Vital signs reviewed. Please see chart in PCC.     General: NAD. NCAT. AOx3  HEENT: PERRLA. EOMI. MMM. Nares patent bl.  Cardiovascular: RRR. S1/S2 wnl.   Respiratory: CTABL. No acute respiratory distress.   GI: Soft, NT abdomen. BS present x 4.   MSK: Generalized weakness.  Status post right hip ORIF  Extremities: No major edema.   Skin: No visible rashes or bruises.   Neuro: Cranial Nerves grossly intact. Motor/sensory wnl.   Psych: Mood wnl.          REVIEW OF SYSTEMS   ROS reviewed within HPI and is otherwise negative       Assessment and Plan  Encounter Diagnoses   Name Primary?   • Orthopedic aftercare Yes   • Atrial fibrillation, unspecified type (Multi)    • Hypertension, unspecified type    • Hyperlipidemia, unspecified hyperlipidemia type    • Hypothyroidism, unspecified type        -Follow-up with orthopedic surgery today for staple removal  -  Monitor CBC for blood counts  - Continue pain management  - Continue chronic home medications  - PT OT as tolerated  -Pre-Admission hospital notes reviewed  -Pertinent radiology, if any, reviewed   -Current rehab plan reviewed; continue pending any major changes  -Current medication therapy reviewed. Continue and monitor for adverse effects.  -Monitor vitals  -Monitor labs  -Continue home medications for chronic medical conditions.   -Low carb, Low sodium, Low fat diet advised         Charting was completed using voice recognition technology and may include unintended errors.    Tiny Bull MD       Electronically Signed By: Tiny Bull MD   8/14/24 11:18 PM

## 2024-08-15 NOTE — PROGRESS NOTES
Date of Service: 7/26/2024      HPI/Subjective  Jessica Thayer is a 89 y.o. female  With past medical history of A-fib, hypertension, hyperlipidemia, hypothyroidism.  She was admitted to [facility] from 7/11/2024 to 7/16/2024 for treatment of right femoral intertrochanteric fracture with right hip ORIF.  Hospitalization was complicated by acute blood loss anemia requiring 1 unit packed RBC transfusion.  Patient to have staples removed today.  Tolerating PTOT appropriately.  No fevers, chills, chest pain, shortness of breath reported.  Plan of care discussed, concerns addressed.    Denies fevers, chills, weight loss, lightheadedness, dizziness, vision changes, sore throat, runny nose, CP, SOB, cough, palpitations, n/v/d, abd pain, black/bloody stools, mood disturbance, or new numbness/weakness/tingling in arms/legs/face.        Other Medical, Surgical, Family, Social Hx, Allergies per chart in PCC.   Medication list reviewed. Please see PCC.     Objective:   Physical Exam     Vital signs reviewed. Please see chart in PCC.     General: NAD. NCAT. AOx3  HEENT: PERRLA. EOMI. MMM. Nares patent bl.  Cardiovascular: RRR. S1/S2 wnl.   Respiratory: CTABL. No acute respiratory distress.   GI: Soft, NT abdomen. BS present x 4.   MSK: Generalized weakness.  Status post right hip ORIF  Extremities: No major edema.   Skin: No visible rashes or bruises.   Neuro: Cranial Nerves grossly intact. Motor/sensory wnl.   Psych: Mood wnl.          REVIEW OF SYSTEMS   ROS reviewed within HPI and is otherwise negative       Assessment and Plan  Encounter Diagnoses   Name Primary?    Orthopedic aftercare Yes    Atrial fibrillation, unspecified type (Multi)     Hypertension, unspecified type     Hyperlipidemia, unspecified hyperlipidemia type     Hypothyroidism, unspecified type        -Follow-up with orthopedic surgery today for staple removal  - Monitor CBC for blood counts  - Continue pain management  - Continue chronic home  medications  - PT OT as tolerated  -Pre-Admission hospital notes reviewed  -Pertinent radiology, if any, reviewed   -Current rehab plan reviewed; continue pending any major changes  -Current medication therapy reviewed. Continue and monitor for adverse effects.  -Monitor vitals  -Monitor labs  -Continue home medications for chronic medical conditions.   -Low carb, Low sodium, Low fat diet advised         Charting was completed using voice recognition technology and may include unintended errors.    Tiny Bull MD

## 2024-08-30 ENCOUNTER — NURSING HOME VISIT (OUTPATIENT)
Dept: POST ACUTE CARE | Facility: EXTERNAL LOCATION | Age: 89
End: 2024-08-30
Payer: MEDICARE

## 2024-08-30 DIAGNOSIS — R60.0 BILATERAL LOWER EXTREMITY EDEMA: Primary | ICD-10-CM

## 2024-08-30 DIAGNOSIS — I10 HYPERTENSION, UNSPECIFIED TYPE: ICD-10-CM

## 2024-08-30 DIAGNOSIS — I48.91 ATRIAL FIBRILLATION, UNSPECIFIED TYPE (MULTI): ICD-10-CM

## 2024-08-30 DIAGNOSIS — Z47.89 ORTHOPEDIC AFTERCARE: ICD-10-CM

## 2024-08-30 PROCEDURE — 99309 SBSQ NF CARE MODERATE MDM 30: CPT | Performed by: STUDENT IN AN ORGANIZED HEALTH CARE EDUCATION/TRAINING PROGRAM

## 2024-08-30 NOTE — LETTER
Patient: Jesscia Thayer  : 1934    Encounter Date: 2024    Date of Service: 2024      HPI/Subjective  Jessica Thayer is a 89 y.o. female  With past medical history of A-fib, hypertension, hyperlipidemia, hypothyroidism.  She was admitted to [facility] from 2024 to 2024 for treatment of right femoral intertrochanteric fracture with right hip ORIF.  Hospitalization was complicated by acute blood loss anemia requiring 1 unit packed RBC transfusion.      Pt is currently admitted to [facility] for skilled rehab. I was asked to re-evaluate patient due to BLLE edema.     Currently c/o mild to moderate BLLE edema. Discussed possible diuresis and patient is open to this.     Denies CP, SOB, APARICIO, palpitations.   Plan of care discussed, concerns addressed.    Denies fevers, chills, weight loss, lightheadedness, dizziness, vision changes, sore throat, runny nose, CP, SOB, cough, palpitations, n/v/d, abd pain, black/bloody stools, mood disturbance, or new numbness/weakness/tingling in arms/legs/face.        Other Medical, Surgical, Family, Social Hx, Allergies per chart in PCC.   Medication list reviewed. Please see PCC.     Objective:   Physical Exam     Vital signs reviewed. Please see chart in PCC.     General: NAD. NCAT. AOx3  HEENT: PERRLA. EOMI. MMM. Nares patent bl.  Cardiovascular: RRR. S1/S2 wnl.   Respiratory: CTABL. No acute respiratory distress.   GI: Soft, NT abdomen. BS present x 4.   MSK: Generalized weakness.  Status post right hip ORIF  Extremities: BLLE 1+ pitting .   Skin: No visible rashes or bruises.   Neuro: Cranial Nerves grossly intact. Motor/sensory wnl.   Psych: Mood wnl.          REVIEW OF SYSTEMS   ROS reviewed within HPI and is otherwise negative       Assessment and Plan  No diagnosis found.      -Consider Lasix admin  -Elevated legs  -Compression stockings recommended   -Monitor for fluid overload   -Monitor vitals/labs  - Continue pain management  - Continue  chronic home medications  - PT OT as tolerated  -Low carb, Low sodium, Low fat diet advised         Charting was completed using voice recognition technology and may include unintended errors.    Tiny Bull MD       Electronically Signed By: Tiny Bull MD   10/21/24  5:32 PM

## 2024-09-09 ENCOUNTER — APPOINTMENT (OUTPATIENT)
Dept: PRIMARY CARE | Facility: CLINIC | Age: 89
End: 2024-09-09
Payer: MEDICARE

## 2024-10-18 ENCOUNTER — APPOINTMENT (OUTPATIENT)
Dept: PRIMARY CARE | Facility: CLINIC | Age: 89
End: 2024-10-18
Payer: MEDICARE

## 2024-10-21 NOTE — PROGRESS NOTES
Date of Service: 8/30/2024      HPI/Subjective  Jessica Thayer is a 89 y.o. female  With past medical history of A-fib, hypertension, hyperlipidemia, hypothyroidism.  She was admitted to [facility] from 7/11/2024 to 7/16/2024 for treatment of right femoral intertrochanteric fracture with right hip ORIF.  Hospitalization was complicated by acute blood loss anemia requiring 1 unit packed RBC transfusion.      Pt is currently admitted to [facility] for skilled rehab. I was asked to re-evaluate patient due to BLLE edema.     Currently c/o mild to moderate BLLE edema. Discussed possible diuresis and patient is open to this.     Denies CP, SOB, APARICIO, palpitations.   Plan of care discussed, concerns addressed.    Denies fevers, chills, weight loss, lightheadedness, dizziness, vision changes, sore throat, runny nose, CP, SOB, cough, palpitations, n/v/d, abd pain, black/bloody stools, mood disturbance, or new numbness/weakness/tingling in arms/legs/face.        Other Medical, Surgical, Family, Social Hx, Allergies per chart in PCC.   Medication list reviewed. Please see PCC.     Objective:   Physical Exam     Vital signs reviewed. Please see chart in PCC.     General: NAD. NCAT. AOx3  HEENT: PERRLA. EOMI. MMM. Nares patent bl.  Cardiovascular: RRR. S1/S2 wnl.   Respiratory: CTABL. No acute respiratory distress.   GI: Soft, NT abdomen. BS present x 4.   MSK: Generalized weakness.  Status post right hip ORIF  Extremities: BLLE 1+ pitting .   Skin: No visible rashes or bruises.   Neuro: Cranial Nerves grossly intact. Motor/sensory wnl.   Psych: Mood wnl.          REVIEW OF SYSTEMS   ROS reviewed within HPI and is otherwise negative       Assessment and Plan  No diagnosis found.      -Consider Lasix admin  -Elevated legs  -Compression stockings recommended   -Monitor for fluid overload   -Monitor vitals/labs  - Continue pain management  - Continue chronic home medications  - PT OT as tolerated  -Low carb, Low sodium, Low  fat diet advised         Charting was completed using voice recognition technology and may include unintended errors.    Tiny Bull MD